# Patient Record
Sex: MALE | Race: BLACK OR AFRICAN AMERICAN | NOT HISPANIC OR LATINO | Employment: FULL TIME | URBAN - METROPOLITAN AREA
[De-identification: names, ages, dates, MRNs, and addresses within clinical notes are randomized per-mention and may not be internally consistent; named-entity substitution may affect disease eponyms.]

---

## 2023-02-28 ENCOUNTER — HOSPITAL ENCOUNTER (EMERGENCY)
Facility: HOSPITAL | Age: 43
Discharge: HOME | End: 2023-02-28
Attending: EMERGENCY MEDICINE

## 2023-02-28 VITALS
RESPIRATION RATE: 18 BRPM | DIASTOLIC BLOOD PRESSURE: 84 MMHG | WEIGHT: 170 LBS | SYSTOLIC BLOOD PRESSURE: 134 MMHG | HEART RATE: 80 BPM | TEMPERATURE: 99 F | OXYGEN SATURATION: 100 % | BODY MASS INDEX: 27.32 KG/M2 | HEIGHT: 66 IN

## 2023-02-28 DIAGNOSIS — R05.9 COUGH: ICD-10-CM

## 2023-02-28 DIAGNOSIS — R11.2 NAUSEA AND VOMITING, UNSPECIFIED VOMITING TYPE: ICD-10-CM

## 2023-02-28 DIAGNOSIS — J06.9 VIRAL URI WITH COUGH: Primary | ICD-10-CM

## 2023-02-28 DIAGNOSIS — K40.90 INGUINAL HERNIA OF RIGHT SIDE WITHOUT OBSTRUCTION OR GANGRENE: ICD-10-CM

## 2023-02-28 DIAGNOSIS — R10.31 RIGHT LOWER QUADRANT ABDOMINAL PAIN: ICD-10-CM

## 2023-02-28 LAB
ALBUMIN SERPL BCP-MCNC: 4 G/DL (ref 3.5–5.2)
ALP SERPL-CCNC: 47 U/L (ref 55–135)
ALT SERPL W/O P-5'-P-CCNC: 19 U/L (ref 10–44)
ANION GAP SERPL CALC-SCNC: 11 MMOL/L (ref 8–16)
AST SERPL-CCNC: 25 U/L (ref 10–40)
BASOPHILS # BLD AUTO: 0.03 K/UL (ref 0–0.2)
BASOPHILS NFR BLD: 0.5 % (ref 0–1.9)
BILIRUB SERPL-MCNC: 0.5 MG/DL (ref 0.1–1)
BILIRUB UR QL STRIP: NEGATIVE
BUN SERPL-MCNC: 11 MG/DL (ref 6–20)
CALCIUM SERPL-MCNC: 9.6 MG/DL (ref 8.7–10.5)
CHLORIDE SERPL-SCNC: 102 MMOL/L (ref 95–110)
CLARITY UR: CLEAR
CO2 SERPL-SCNC: 27 MMOL/L (ref 23–29)
COLOR UR: COLORLESS
CREAT SERPL-MCNC: 1.1 MG/DL (ref 0.5–1.4)
CTP QC/QA: YES
DIFFERENTIAL METHOD: ABNORMAL
EOSINOPHIL # BLD AUTO: 0.2 K/UL (ref 0–0.5)
EOSINOPHIL NFR BLD: 3.3 % (ref 0–8)
ERYTHROCYTE [DISTWIDTH] IN BLOOD BY AUTOMATED COUNT: 12.9 % (ref 11.5–14.5)
EST. GFR  (NO RACE VARIABLE): >60 ML/MIN/1.73 M^2
GLUCOSE SERPL-MCNC: 81 MG/DL (ref 70–110)
GLUCOSE UR QL STRIP: NEGATIVE
HCT VFR BLD AUTO: 41.2 % (ref 40–54)
HGB BLD-MCNC: 14.3 G/DL (ref 14–18)
HGB UR QL STRIP: NEGATIVE
IMM GRANULOCYTES # BLD AUTO: 0.02 K/UL (ref 0–0.04)
IMM GRANULOCYTES NFR BLD AUTO: 0.3 % (ref 0–0.5)
KETONES UR QL STRIP: NEGATIVE
LEUKOCYTE ESTERASE UR QL STRIP: NEGATIVE
LIPASE SERPL-CCNC: 17 U/L (ref 4–60)
LYMPHOCYTES # BLD AUTO: 1.3 K/UL (ref 1–4.8)
LYMPHOCYTES NFR BLD: 22.9 % (ref 18–48)
MCH RBC QN AUTO: 31.9 PG (ref 27–31)
MCHC RBC AUTO-ENTMCNC: 34.7 G/DL (ref 32–36)
MCV RBC AUTO: 92 FL (ref 82–98)
MOLECULAR STREP A: NEGATIVE
MONOCYTES # BLD AUTO: 0.6 K/UL (ref 0.3–1)
MONOCYTES NFR BLD: 9.5 % (ref 4–15)
NEUTROPHILS # BLD AUTO: 3.7 K/UL (ref 1.8–7.7)
NEUTROPHILS NFR BLD: 63.5 % (ref 38–73)
NITRITE UR QL STRIP: NEGATIVE
NRBC BLD-RTO: 0 /100 WBC
PH UR STRIP: 6 [PH] (ref 5–8)
PLATELET # BLD AUTO: 227 K/UL (ref 150–450)
PMV BLD AUTO: 10.5 FL (ref 9.2–12.9)
POC MOLECULAR INFLUENZA A AGN: NEGATIVE
POC MOLECULAR INFLUENZA B AGN: NEGATIVE
POTASSIUM SERPL-SCNC: 3.6 MMOL/L (ref 3.5–5.1)
PROT SERPL-MCNC: 8.2 G/DL (ref 6–8.4)
PROT UR QL STRIP: NEGATIVE
RBC # BLD AUTO: 4.48 M/UL (ref 4.6–6.2)
SARS-COV-2 RDRP RESP QL NAA+PROBE: NEGATIVE
SODIUM SERPL-SCNC: 140 MMOL/L (ref 136–145)
SP GR UR STRIP: 1.01 (ref 1–1.03)
URN SPEC COLLECT METH UR: ABNORMAL
UROBILINOGEN UR STRIP-ACNC: NEGATIVE EU/DL
WBC # BLD AUTO: 5.81 K/UL (ref 3.9–12.7)

## 2023-02-28 PROCEDURE — 99285 EMERGENCY DEPT VISIT HI MDM: CPT | Mod: 25

## 2023-02-28 PROCEDURE — 96361 HYDRATE IV INFUSION ADD-ON: CPT

## 2023-02-28 PROCEDURE — 85025 COMPLETE CBC W/AUTO DIFF WBC: CPT | Performed by: STUDENT IN AN ORGANIZED HEALTH CARE EDUCATION/TRAINING PROGRAM

## 2023-02-28 PROCEDURE — 63600175 PHARM REV CODE 636 W HCPCS

## 2023-02-28 PROCEDURE — 83690 ASSAY OF LIPASE: CPT | Performed by: STUDENT IN AN ORGANIZED HEALTH CARE EDUCATION/TRAINING PROGRAM

## 2023-02-28 PROCEDURE — 87502 INFLUENZA DNA AMP PROBE: CPT

## 2023-02-28 PROCEDURE — 96374 THER/PROPH/DIAG INJ IV PUSH: CPT

## 2023-02-28 PROCEDURE — 25500020 PHARM REV CODE 255

## 2023-02-28 PROCEDURE — 87651 STREP A DNA AMP PROBE: CPT

## 2023-02-28 PROCEDURE — 96375 TX/PRO/DX INJ NEW DRUG ADDON: CPT

## 2023-02-28 PROCEDURE — 81003 URINALYSIS AUTO W/O SCOPE: CPT | Performed by: STUDENT IN AN ORGANIZED HEALTH CARE EDUCATION/TRAINING PROGRAM

## 2023-02-28 PROCEDURE — 25000003 PHARM REV CODE 250

## 2023-02-28 PROCEDURE — 80053 COMPREHEN METABOLIC PANEL: CPT | Performed by: STUDENT IN AN ORGANIZED HEALTH CARE EDUCATION/TRAINING PROGRAM

## 2023-02-28 RX ORDER — KETOROLAC TROMETHAMINE 30 MG/ML
15 INJECTION, SOLUTION INTRAMUSCULAR; INTRAVENOUS
Status: COMPLETED | OUTPATIENT
Start: 2023-02-28 | End: 2023-02-28

## 2023-02-28 RX ORDER — FLUTICASONE PROPIONATE 50 MCG
1 SPRAY, SUSPENSION (ML) NASAL 2 TIMES DAILY PRN
Qty: 15 G | Refills: 0 | Status: SHIPPED | OUTPATIENT
Start: 2023-02-28 | End: 2023-07-07

## 2023-02-28 RX ORDER — ONDANSETRON 2 MG/ML
4 INJECTION INTRAMUSCULAR; INTRAVENOUS
Status: COMPLETED | OUTPATIENT
Start: 2023-02-28 | End: 2023-02-28

## 2023-02-28 RX ORDER — ONDANSETRON 4 MG/1
8 TABLET, ORALLY DISINTEGRATING ORAL EVERY 6 HOURS PRN
Qty: 15 TABLET | Refills: 0 | Status: SHIPPED | OUTPATIENT
Start: 2023-02-28 | End: 2023-07-07

## 2023-02-28 RX ORDER — PROMETHAZINE HYDROCHLORIDE AND DEXTROMETHORPHAN HYDROBROMIDE 6.25; 15 MG/5ML; MG/5ML
5 SYRUP ORAL EVERY 4 HOURS PRN
Qty: 118 ML | Refills: 0 | Status: SHIPPED | OUTPATIENT
Start: 2023-02-28 | End: 2023-07-07

## 2023-02-28 RX ORDER — ACETAMINOPHEN 500 MG
500 TABLET ORAL EVERY 6 HOURS PRN
Qty: 20 TABLET | Refills: 0 | Status: SHIPPED | OUTPATIENT
Start: 2023-02-28 | End: 2023-07-07

## 2023-02-28 RX ORDER — IBUPROFEN 600 MG/1
600 TABLET ORAL EVERY 6 HOURS PRN
Qty: 20 TABLET | Refills: 0 | Status: SHIPPED | OUTPATIENT
Start: 2023-02-28 | End: 2023-07-07

## 2023-02-28 RX ORDER — CETIRIZINE HYDROCHLORIDE 10 MG/1
10 TABLET ORAL DAILY PRN
Qty: 30 TABLET | Refills: 0 | Status: SHIPPED | OUTPATIENT
Start: 2023-02-28 | End: 2023-07-07

## 2023-02-28 RX ORDER — DICYCLOMINE HYDROCHLORIDE 20 MG/1
20 TABLET ORAL 2 TIMES DAILY PRN
Qty: 30 TABLET | Refills: 0 | Status: SHIPPED | OUTPATIENT
Start: 2023-02-28 | End: 2023-07-07

## 2023-02-28 RX ADMIN — KETOROLAC TROMETHAMINE 15 MG: 30 INJECTION, SOLUTION INTRAMUSCULAR; INTRAVENOUS at 06:02

## 2023-02-28 RX ADMIN — SODIUM CHLORIDE 1000 ML: 9 INJECTION, SOLUTION INTRAVENOUS at 05:02

## 2023-02-28 RX ADMIN — ONDANSETRON 4 MG: 2 INJECTION INTRAMUSCULAR; INTRAVENOUS at 06:02

## 2023-02-28 RX ADMIN — IOHEXOL 100 ML: 350 INJECTION, SOLUTION INTRAVENOUS at 06:02

## 2023-02-28 NOTE — ED PROVIDER NOTES
Encounter Date: 2/28/2023    SCRIBE #1 NOTE: I, Gaye Abad, am scribing for, and in the presence of,  Alayna Holdsworth, PA-C. I have scribed the following portions of the note - Other sections scribed: HPI, ROS.     History     Chief Complaint   Patient presents with    Cough    Shortness of Breath    Vomiting    Abdominal Pain     The patient reports upper abdominal pain, nausea, vomiting x 2 days. Patient also reports sob, wheezing, and a cough that started yesterday. Denies runny nose, headache, fever, headaches, body aches. He states that he has tenderness behind bilateral ears.    Otalgia     Cholo Huang 42 y.o. male, with Asthma, presents to the ED with intermittent periumbilical abdominal pain onset last night. Patient describes the pain as a dull pain and rates it a 6/10. Patient denies trauma. Patient states that the pain worsens when he has too much to each. Patient also reports a dry cough, chest pain secondary to cough, congestion, and a mild headache. Patient also reports 3-4 episodes of nausea and vomiting. Treatment has been attempted with Tylenol for pain which provided some relief. Patient needs a refill on his inhaler. Patient denies recent known sick contact. Patient reports having similar symptoms before when he had a stomach virus and when he was informed that he was lactose intolerant. Patient denies fever, sweats, chills, sore throat, ear pain, SOB, rhinorrhea, diarrhea, constipation, decreased urine, generalized myalgias, or other associated symptoms. Patient denies history of abdominal surgeries. Patient reports drinking socially. Patient denies recreational drug use. Patient has NKDA.    The history is provided by the patient. No  was used.   Review of patient's allergies indicates:  No Known Allergies  History reviewed. No pertinent past medical history.  History reviewed. No pertinent surgical history.  History reviewed. No pertinent family history.  Social  History     Tobacco Use    Smoking status: Never    Smokeless tobacco: Never   Substance Use Topics    Alcohol use: Not Currently    Drug use: Never     Review of Systems   Constitutional:  Negative for chills, diaphoresis, fatigue and fever.        (-) Sweats   HENT:  Positive for congestion. Negative for ear pain, rhinorrhea and sore throat.    Respiratory:  Positive for cough. Negative for shortness of breath and wheezing.    Cardiovascular:  Positive for chest pain (secondary to cough). Negative for palpitations and leg swelling.   Gastrointestinal:  Positive for abdominal pain, nausea and vomiting. Negative for constipation and diarrhea.   Genitourinary:  Negative for decreased urine volume, difficulty urinating, dysuria, frequency, hematuria, penile discharge, penile pain, penile swelling and urgency.   Musculoskeletal:  Negative for arthralgias and myalgias.   Skin:  Negative for rash.   Neurological:  Positive for headaches. Negative for dizziness, weakness and light-headedness.     Physical Exam     Initial Vitals [02/28/23 1536]   BP Pulse Resp Temp SpO2   118/71 76 16 97.9 °F (36.6 °C) 98 %      MAP       --         Physical Exam    Nursing note and vitals reviewed.  Constitutional: Vital signs are normal. He appears well-developed and well-nourished. He is not diaphoretic. He is active. He does not appear ill. No distress.   HENT:   Head: Normocephalic and atraumatic.   Right Ear: Tympanic membrane, external ear and ear canal normal.   Left Ear: Tympanic membrane and external ear normal.   Nose: Nose normal.   Mouth/Throat: Uvula is midline, oropharynx is clear and moist and mucous membranes are normal.   Eyes: Conjunctivae, EOM and lids are normal. Pupils are equal, round, and reactive to light. Right eye exhibits no discharge. Left eye exhibits no discharge. No scleral icterus.   Neck: Neck supple.   Normal range of motion.   Full passive range of motion without pain.     Cardiovascular:  Normal rate  and regular rhythm.           Pulmonary/Chest: Effort normal and breath sounds normal. No respiratory distress.   Abdominal: Abdomen is soft. He exhibits no distension. There is abdominal tenderness in the right lower quadrant and periumbilical area. There is no rebound, no guarding and no tenderness at McBurney's point. positive psoas signnegative Rovsing's sign  Musculoskeletal:         General: Normal range of motion.      Cervical back: Full passive range of motion without pain, normal range of motion and neck supple.     Neurological: He is alert and oriented to person, place, and time. He has normal strength. GCS eye subscore is 4. GCS verbal subscore is 5. GCS motor subscore is 6.   Skin: Skin is dry. Capillary refill takes less than 2 seconds.       ED Course   Procedures  Labs Reviewed   CBC W/ AUTO DIFFERENTIAL - Abnormal; Notable for the following components:       Result Value    RBC 4.48 (*)     MCH 31.9 (*)     All other components within normal limits   COMPREHENSIVE METABOLIC PANEL - Abnormal; Notable for the following components:    Alkaline Phosphatase 47 (*)     All other components within normal limits   URINALYSIS, REFLEX TO URINE CULTURE - Abnormal; Notable for the following components:    Color, UA Colorless (*)     All other components within normal limits    Narrative:     Specimen Source->Urine   LIPASE   SARS-COV-2 RDRP GENE   POCT INFLUENZA A/B MOLECULAR   POCT STREP A MOLECULAR   ISTAT CHEM8          Imaging Results              CT Abdomen Pelvis With Contrast (Final result)  Result time 02/28/23 19:50:50      Final result by Micheline Sánchez MD (02/28/23 19:50:50)                   Impression:      Large right nonobstructing inguinal hernia containing colon, small bowel, and fat.  No associated bowel obstruction.      Electronically signed by: Micheline Sánchez  Date:    02/28/2023  Time:    19:50               Narrative:    EXAMINATION:  CT OF ABDOMEN PELVIS WITH    CLINICAL  HISTORY:  Nausea/vomiting;RLQ abdominal pain (Age >= 14y);    TECHNIQUE:  3.75 mm enhanced axial images were obtained from the lung bases through the greater trochanters.  One hundred mL of Omnipaque 350 was injected.    COMPARISON:  None.    FINDINGS:  The liver, spleen, pancreas, kidneys, and adrenal glands are unremarkable. The gallbladder is contracted.    There is no definite evidence for abdominal adenopathy or ascites.    There is a large right inguinal hernia containing colon, small bowel, and fat.  There is no small bowel obstruction.  There are no pelvic masses or adenopathy.  The appendix is not inflamed.    There is no free fluid in the pelvis.    There is mild bibasilar atelectasis.    Spondylolisthesis and spondylolysis is seen at L5/S1.                                       X-Ray Chest PA And Lateral (Final result)  Result time 02/28/23 16:38:58      Final result by Maurice Campbell MD (02/28/23 16:38:58)                   Impression:      1. No acute cardiopulmonary process.      Electronically signed by: Maurice Campbell MD  Date:    02/28/2023  Time:    16:38               Narrative:    EXAMINATION:  XR CHEST PA AND LATERAL    CLINICAL HISTORY:  Cough, unspecified    TECHNIQUE:  PA and lateral views of the chest were performed.    COMPARISON:  None    FINDINGS:  The cardiomediastinal silhouette is not enlarged.  There is no pleural effusion.  The trachea is midline.  The lungs are symmetrically expanded bilaterally without evidence of acute parenchymal process. No large focal consolidation seen.  There is no pneumothorax.  The osseous structures are unremarkable.                                       Medications   sodium chloride 0.9% bolus 1,000 mL 1,000 mL (0 mLs Intravenous Stopped 2/28/23 1835)   ketorolac injection 15 mg (15 mg Intravenous Given 2/28/23 1801)   ondansetron injection 4 mg (4 mg Intravenous Given 2/28/23 1801)   iohexoL (OMNIPAQUE 350) injection 100 mL (100 mLs Intravenous  Given 2/28/23 1849)     Medical Decision Making:   History:   Old Medical Records: I decided to obtain old medical records.  Initial Assessment:   42 y.o. male, with Asthma, presents to the ED with intermittent periumbilical abdominal pain.  Patient's chart and medical history reviewed.  Differential Diagnosis:   Cholecystitis  Choledocholithiasis  Pancreatitis  Gastritis  GERD  SBO  Colitis  Appendicitis  UTI  COVID  Flu  Strep throat  Viral URI    Clinical Tests:   Lab Tests: Ordered and Reviewed  Radiological Study: Ordered and Reviewed  ED Management:  Patient's vitals reviewed.  He is afebrile, no respiratory distress, nontoxic-appearing in the ED. patient periumbilical and right lower quadrant tenderness to palpation.  Patient started on fluids given Toradol for pain, and Zofran for nausea.  Patient's COVID, flu, and strep negative.  CMP was unremarkable.  Lipase in normal range, pancreatitis unlikely.  CBC was unremarkable.  UA was unremarkable.  Chest x-ray showed no acute processes.  Patient states he is feeling a lot better.  CT showed a large right nonobstructing inguinal hernia containing colon, small bowel, and fat. No associated bowel obstruction.  Patient states he has known about this.  No tenderness to palpation.  Patient will be sent a referral to General surgery for further management of this hernia.  Discussed with patient his upper respiratory symptoms is most likely a viral URI.  Patient will be sent home on Motrin, Tylenol, Flonase, Zyrtec, promethazine DM cough syrup, and Tessalon Perles for symptomatic control.  Patient follow-up with General surgery.  Instructed patient to rest and stay well hydrated. Patient agrees with this plan. Discussed with him strict return precautions, he verbalized understanding. Patient is stable for discharge.           Scribe Attestation:   Scribe #1: I performed the above scribed service and the documentation accurately describes the services I performed. I  attest to the accuracy of the note.                 I, Alayna Holdsworth,PA-C, personally performed the services described in this documentation.  All medical record entries made by the scribe were at my direction and in my presence.  I have reviewed the chart and agree that the record reflects my personal performance and is accurate and complete.   Clinical Impression:   Final diagnoses:  [R05.9] Cough  [J06.9] Viral URI with cough (Primary)  [K40.90] Inguinal hernia of right side without obstruction or gangrene  [R11.2] Nausea and vomiting, unspecified vomiting type  [R10.31] Right lower quadrant abdominal pain        ED Disposition Condition    Discharge Stable          ED Prescriptions       Medication Sig Dispense Start Date End Date Auth. Provider    ibuprofen (ADVIL,MOTRIN) 600 MG tablet Take 1 tablet (600 mg total) by mouth every 6 (six) hours as needed for Pain. 20 tablet 2/28/2023 -- Alayna Holdsworth, PA-C    acetaminophen (TYLENOL) 500 MG tablet Take 1 tablet (500 mg total) by mouth every 6 (six) hours as needed for Temperature greater than or Pain. 20 tablet 2/28/2023 -- Alayna Holdsworth, PA-C    dicyclomine (BENTYL) 20 mg tablet Take 1 tablet (20 mg total) by mouth 2 (two) times daily as needed (Stomach pain). 30 tablet 2/28/2023 -- Alayna Holdsworth, PA-C    fluticasone propionate (FLONASE) 50 mcg/actuation nasal spray 1 spray (50 mcg total) by Each Nostril route 2 (two) times daily as needed for Rhinitis or Allergies. 15 g 2/28/2023 -- Alayna Holdsworth, PA-C    promethazine-dextromethorphan (PROMETHAZINE-DM) 6.25-15 mg/5 mL Syrp Take 5 mLs by mouth every 4 (four) hours as needed (cough/congestion). 118 mL 2/28/2023 -- Alayna Holdsworth, PA-C    cetirizine (ZYRTEC) 10 MG tablet Take 1 tablet (10 mg total) by mouth daily as needed for Allergies or Rhinitis. 30 tablet 2/28/2023 -- Alayna Holdsworth, PA-C    ondansetron (ZOFRAN-ODT) 4 MG TbDL Take 2 tablets (8 mg total) by mouth every 6 (six) hours as  needed (Nausea). 15 tablet 2/28/2023 -- Alayna Holdsworth, PA-C          Follow-up Information       Follow up With Specialties Details Why Contact Info    Donta Rivas MD General Surgery, Bariatrics Call   [address] [place] [address]  [phone]               Alayna Holdsworth, PA-C  02/28/23 2008

## 2023-02-28 NOTE — Clinical Note
"Cholo"Fernie Huang was seen and treated in our emergency department on 2/28/2023.  He may return to work on 03/01/2023.       If you have any questions or concerns, please don't hesitate to call.      Alayna Holdsworth, PA-C"

## 2023-03-01 NOTE — DISCHARGE INSTRUCTIONS
Thank you for coming to our Emergency Department today. It is important to remember that some problems are difficult to diagnose and may not be found during your first visit. Be sure to follow up with your primary care doctor and review any labs/imaging that was performed with them. If you do not have a primary care doctor, you may contact the one listed on your discharge paperwork or you may also call the [facility] Appointment Desk at [phone] to schedule an appointment with one.     All medications may potentially have side effects and it is impossible to predict which medications may give you side effects. If you feel that you are having a negative effect of any medication you should immediately stop taking them and seek medical attention.    Return to the ER with any questions/concerns, new/concerning symptoms, worsening or failure to improve. Do not drive or make any important decisions for 24 hours if you have received any pain medications, sedatives or mood altering drugs during your ER visit.

## 2023-07-14 PROBLEM — K40.91 RECURRENT INGUINAL HERNIA OF RIGHT SIDE WITHOUT OBSTRUCTION OR GANGRENE: Status: ACTIVE | Noted: 2023-07-14

## 2024-01-22 ENCOUNTER — EMERGENCY (EMERGENCY)
Facility: HOSPITAL | Age: 44
LOS: 1 days | Discharge: HOME | End: 2024-01-22
Attending: EMERGENCY MEDICINE | Admitting: EMERGENCY MEDICINE
Payer: MEDICARE

## 2024-01-22 VITALS
TEMPERATURE: 98 F | HEART RATE: 99 BPM | DIASTOLIC BLOOD PRESSURE: 72 MMHG | RESPIRATION RATE: 16 BRPM | WEIGHT: 231.04 LBS | OXYGEN SATURATION: 95 % | SYSTOLIC BLOOD PRESSURE: 113 MMHG | HEIGHT: 70 IN

## 2024-01-22 DIAGNOSIS — S01.01XA LACERATION WITHOUT FOREIGN BODY OF SCALP, INITIAL ENCOUNTER: ICD-10-CM

## 2024-01-22 DIAGNOSIS — U07.1 COVID-19: ICD-10-CM

## 2024-01-22 DIAGNOSIS — F17.200 NICOTINE DEPENDENCE, UNSPECIFIED, UNCOMPLICATED: ICD-10-CM
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
## 2024-01-22 LAB
FLUAV AG NPH QL: SIGNIFICANT CHANGE UP
FLUBV AG NPH QL: SIGNIFICANT CHANGE UP
RSV RNA NPH QL NAA+NON-PROBE: SIGNIFICANT CHANGE UP
SARS-COV-2 RNA SPEC QL NAA+PROBE: DETECTED

## 2024-01-22 PROCEDURE — 12002 RPR S/N/AX/GEN/TRNK2.6-7.5CM: CPT

## 2024-01-22 PROCEDURE — 99284 EMERGENCY DEPT VISIT MOD MDM: CPT | Mod: 25

## 2024-01-22 PROCEDURE — 99283 EMERGENCY DEPT VISIT LOW MDM: CPT | Mod: 25
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
## 2024-01-22 NOTE — ED PROVIDER NOTE - CARE PLAN
1 Principal Discharge DX:	Scalp laceration   Principal Discharge DX:	Scalp laceration  Secondary Diagnosis:	2019 novel coronavirus disease (COVID-19)

## 2024-01-22 NOTE — ED PROVIDER NOTE - PHYSICAL EXAMINATION
Gen: Well-appearing, No acute distress  HEENT: Stellate laceration on frontal scalp measuring [id number] from center (3 arms), deep to muscular layer  CV: Regular rate and rhythm   Lungs: Speaking full sentences, No respiratory distress, No tachypnea   Abd: Non-distended, Flat abdomen  Ext: No gross deformities, No peripheral edema  Neuro: A&O, Moving all extremities symmetrically, Normal gait  Skin: No rash, No pallor  Psych: Not responding to internal stimuli, Appropriate affect

## 2024-01-22 NOTE — ED PROVIDER NOTE - PROGRESS NOTE DETAILS
Advised patient of positive COVID result.  Recommending isolation, rest, hydration.  Speaking full sentences, non-hypoxic, without symptoms at time of discharge.
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
## 2024-01-22 NOTE — ED PROVIDER NOTE - PATIENT PORTAL LINK FT
21-Feb-2021 06:36
You can access the FollowMyHealth Patient Portal offered by [facility] by registering at the following website: [email or web address]. By joining [employer]’s FollowMyHealth portal, you will also be able to view your health information using other applications (apps) compatible with our system.

## 2024-01-22 NOTE — ED PROVIDER NOTE - NSFOLLOWUPINSTRUCTIONS_ED_ALL_ED_FT
Wound Closure Removal, Care After  The following information offers guidance on how to care for yourself after your stitches (sutures), staples, or adhesive strips have been removed. Your health care provider may also give you more specific instructions. If you have problems or questions, contact your health care provider.    What can I expect after the procedure?  After your sutures or staples have been removed or your adhesive strips have fallen off, it is common to have:  Some discomfort and swelling in the area.  Slight redness in the area.  Follow these instructions at home:  If you have a dressing:    Wash your hands with soap and water for at least 20 seconds before and after you change your bandage (dressing). If soap and water are not available, use hand .  Change your dressing as told by your health care provider. If your dressing becomes wet or dirty, or develops a bad smell, change it as soon as possible.  If your dressing sticks to your skin, pour warm, clean water over it until it loosens and can be removed without pulling apart the wound edges. Pat the area dry with a soft, clean towel. Do not rub the wound because that may cause bleeding.  Wound care    Washing hands with soap and water.  Check your wound every day for signs of infection. Check for:  More redness, swelling, or pain.  Fluid or blood.  New warmth, a rash, or hardness at the wound site.  Pus or a bad smell.  Wash your hands with soap and water for at least 20 seconds before and after touching your wound. If soap and water are not available, use hand .  Keep the wound area dry and clean. Clean and pat the wound dry as told by your health care provider.  Apply cream or ointment only as told by your health care provider.  If skin glue or adhesive strips were applied after sutures or staples were removed, leave these closures in place until they peel off on their own. If adhesive strip edges start to loosen and curl up, you may trim the loose edges. Do not remove adhesive strips completely unless your health care provider tells you to do that.  Continue to protect the wound from injury.  Do not pick at your wound. Picking can cause an infection.  Bathing    Do not take baths, swim, or use a hot tub until your health care provider approves. Ask your health care provider if you may take showers.  Follow these steps for showering:  If you have a dressing, remove it before getting into the shower.  In the shower, allow soapy water to get on the wound. Avoid scrubbing the wound.  When you get out of the shower, dry the wound by patting it with a clean towel.  Reapply a dressing over the wound, if needed.  Scar care    When your wound has completely healed, help decrease the size of your scar by:  Wearing sunscreen over the scar or covering it with clothing when you are outside. New scars get sunburned easily, which can make scarring worse.  Gently massaging the scarred area. This can decrease scar thickness.  General instructions    Take over-the-counter and prescription medicines only as told by your health care provider.  Keep all follow-up visits. This is important.  Contact a health care provider if:  You have more redness, swelling, or pain around your wound.  You have fluid or blood coming from your wound.  You have new warmth, a rash, or hardness at the wound site.  You have pus or a bad smell coming from your wound.  Your wound opens up.  Get help right away if:  You have a fever or chills.  You have red streaks coming from your wound.  Summary  Change your dressing as told by your health care provider. If your dressing becomes wet or dirty, or develops a bad smell, change it as soon as possible.  Check your wound every day for signs of infection.  Wash your hands with soap and water for 20 seconds before and after touching your wound.  This information is not intended to replace advice given to you by your health care provider. Make sure you discuss any questions you have with your health care provider.    Document Revised: 04/12/2022 Document Reviewed: 04/12/2022  Elsevier Patient Education © 2023 [employer]. Wound Closure Removal, Care After  The following information offers guidance on how to care for yourself after your stitches (sutures), staples, or adhesive strips have been removed. Your health care provider may also give you more specific instructions. If you have problems or questions, contact your health care provider.    What can I expect after the procedure?  After your sutures or staples have been removed or your adhesive strips have fallen off, it is common to have:  Some discomfort and swelling in the area.  Slight redness in the area.  Follow these instructions at home:  If you have a dressing:    Wash your hands with soap and water for at least 20 seconds before and after you change your bandage (dressing). If soap and water are not available, use hand .  Change your dressing as told by your health care provider. If your dressing becomes wet or dirty, or develops a bad smell, change it as soon as possible.  If your dressing sticks to your skin, pour warm, clean water over it until it loosens and can be removed without pulling apart the wound edges. Pat the area dry with a soft, clean towel. Do not rub the wound because that may cause bleeding.  Wound care    Washing hands with soap and water.  Check your wound every day for signs of infection. Check for:  More redness, swelling, or pain.  Fluid or blood.  New warmth, a rash, or hardness at the wound site.  Pus or a bad smell.  Wash your hands with soap and water for at least 20 seconds before and after touching your wound. If soap and water are not available, use hand .  Keep the wound area dry and clean. Clean and pat the wound dry as told by your health care provider.  Apply cream or ointment only as told by your health care provider.  If skin glue or adhesive strips were applied after sutures or staples were removed, leave these closures in place until they peel off on their own. If adhesive strip edges start to loosen and curl up, you may trim the loose edges. Do not remove adhesive strips completely unless your health care provider tells you to do that.  Continue to protect the wound from injury.  Do not pick at your wound. Picking can cause an infection.  Bathing    Do not take baths, swim, or use a hot tub until your health care provider approves. Ask your health care provider if you may take showers.  Follow these steps for showering:  If you have a dressing, remove it before getting into the shower.  In the shower, allow soapy water to get on the wound. Avoid scrubbing the wound.  When you get out of the shower, dry the wound by patting it with a clean towel.  Reapply a dressing over the wound, if needed.  Scar care    When your wound has completely healed, help decrease the size of your scar by:  Wearing sunscreen over the scar or covering it with clothing when you are outside. New scars get sunburned easily, which can make scarring worse.  Gently massaging the scarred area. This can decrease scar thickness.  General instructions    Take over-the-counter and prescription medicines only as told by your health care provider.  Keep all follow-up visits. This is important.  Contact a health care provider if:  You have more redness, swelling, or pain around your wound.  You have fluid or blood coming from your wound.  You have new warmth, a rash, or hardness at the wound site.  You have pus or a bad smell coming from your wound.  Your wound opens up.  Get help right away if:  You have a fever or chills.  You have red streaks coming from your wound.  Summary  Change your dressing as told by your health care provider. If your dressing becomes wet or dirty, or develops a bad smell, change it as soon as possible.  Check your wound every day for signs of infection.  Wash your hands with soap and water for 20 seconds before and after touching your wound.  This information is not intended to replace advice given to you by your health care provider. Make sure you discuss any questions you have with your health care provider.    Document Revised: 04/12/2022 Document Reviewed: 04/12/2022  Elsevier Patient Education © 2023 Elsevier Inc.    COVID-19  COVID-19 is an infection caused by a virus called SARS-CoV-2. Most people who get COVID-19 have mild to moderate symptoms. Some have little to no symptoms. In others, the virus may cause a severe infection.    What are the causes?  The human body, showing how the coronavirus travels from the air to a person's lungs.  COVID-19 is caused by a coronavirus. The virus may be in the air as droplets or as tiny specks of fluid (aerosols). It may also be on surfaces. You may catch the virus if you:  Breathe in droplets when a person with COVID-19 breathes, speaks, sings, coughs, or sneezes.  Touch something that has the virus on it and then touch your mouth, nose, or eyes.  What increases the risk?  Risk for infection:    You are more likely to get COVID-19 if:  You are within 6 ft (1.8 m) of a person who has COVID-19 for 15 minutes or longer.  You provide care to a person who has COVID-19.  You are in close contact with others. This includes hugging, kissing, or sharing utensils.  Risk for serious illness caused by COVID-19:    You are more likely to get very ill from COVID-19 if:  You have cancer.  You have a long-term (chronic) disease. This may be:  A chronic lung disease, such as pulmonary embolism, chronic obstructive pulmonary disease (COPD), or cystic fibrosis.  A disease that affects your body's defense system (immune system). If you have a weak immune system, you are said to be immunocompromised.  A serious heart condition, such as heart failure, coronary artery disease, or cardiomyopathy.  Diabetes.  Chronic kidney disease.  A liver disease, such as cirrhosis, nonalcoholic fatty liver disease, alcoholic liver disease, or autoimmune hepatitis.  You are obese.  You are pregnant or were just pregnant.  You have sickle cell disease.  What are the signs or symptoms?  Symptoms of COVID-19 can range from mild to severe. They may appear any time from 2 to 14 days after you are exposed. They include:  Fever or chills.  Shortness of breath or trouble breathing.  Feeling tired.  Headaches, body aches, or muscle aches.  A runny or stuffy nose.  Sneezing, coughing, or a sore throat.  New loss of taste or smell.  You may also have stomach problems, such as nausea, vomiting, or diarrhea.    In some cases, you may not have any symptoms.    How is this diagnosed?  A sample being collected by swabbing the nose.  COVID-19 may be diagnosed by testing a sample to check for the virus. The most common tests are the PCR test and the antigen test. Tests may be done in the lab or at home. They include:  Using a swab to take a sample of fluid from your nose.  Testing a sample of saliva from your mouth.  Testing a sample of mucus from your lungs (sputum).  How is this treated?  Treatment for COVID-19 depends on how severe your condition is.  Mild symptoms can be treated at home. You should rest, drink fluids, and take over-the-counter medicine.  If you have symptoms and risk factors, you may be prescribed a medicine that fights viruses (antiviral).  Severe symptoms may be treated in a hospital intensive care unit (ICU). Treatment may include:  Extra oxygen given through a tube in the nose, a face mask, or a canales.  Medicines. These may include:  Antivirals, such as remdesivir.  Anti-inflammatories, such as corticosteroids. These help reduce inflammation.  Antithrombotics. These help prevent or treat blood clots.  Convalescent plasma. This helps boost your immune system.  Prone positioning. This is when you are laid on your stomach to help oxygen get into your lungs.  Infection control measures.  If you are at risk for a more serious illness, your health care provider may prescribe two medicines to help your immune system protect you. These are called long-acting monoclonal antibodies. They are given together every 6 months.    How is this prevented?  To protect yourself:    Get the vaccine or vaccine series if you meet the guidelines. You can even get the vaccine while you are pregnant or making breast milk (lactating).  Get an added dose of the vaccine if you are immunocompromised. This applies if you have had an organ transplant or if you have a condition that affects your immune system.  You should get the added dose 4 weeks after you got the first one.  If you get an mRNA vaccine, you will need to get 3 doses.  Talk to your provider about getting experimental monoclonal antibodies. This treatment can help prevent severe illness. It may be given to you if:  You are immunocompromised.  You cannot get the vaccine. You may not get the vaccine if you have a severe allergic reaction to it or to what it is made of.  You are not fully vaccinated.  You are in a place where there is COVID-19 and:  You are in close contact with someone who has COVID-19.  You are at high risk of being exposed.  You are at risk of illness from new variants of the virus.  To protect others:    If you have symptoms of COVID-19, take steps to stop the virus from spreading.  Stay home. Leave your house only to get medical care. Do not use public transit.  Do not travel while you are sick.  Wash your hands often with soap and water for at least 20 seconds. If soap and water are not available, use alcohol-based hand .  Make sure that all people in your household wash their hands well and often.  Cough or sneeze into a tissue or your sleeve or elbow. Do not cough or sneeze into your hand or into the air.  Where to find more information  Centers for Disease Control and Prevention (CDC): cdc.gov  World Health Organization (WHO): who.int  Get help right away if:  You have trouble breathing.  You have pain or pressure in your chest.  You are confused.  Your lips or fingernails turn blue.  You have trouble waking from sleep.  Your symptoms get worse.  These symptoms may be an emergency. Get help right away. Call 911.  Do not wait to see if the symptoms will go away.  Do not drive yourself to the hospital.  This information is not intended to replace advice given to you by your health care provider. Make sure you discuss any questions you have with your health care provider.    Document Revised: 09/01/2023 Document Reviewed: 09/01/2023  Elsevier Patient Education © 2023 Elsevier Inc.

## 2024-01-22 NOTE — ED ADULT NURSE NOTE - NSFALLUNIVINTERV_ED_ALL_ED
Bed/Stretcher in lowest position, wheels locked, appropriate side rails in place/Call bell, personal items and telephone in reach/Instruct patient to call for assistance before getting out of bed/chair/stretcher/Non-slip footwear applied when patient is off stretcher/[place] to call system/Physically safe environment - no spills, clutter or unnecessary equipment/Purposeful proactive rounding/Room/bathroom lighting operational, light cord in reach

## 2024-01-27 ENCOUNTER — EMERGENCY (EMERGENCY)
Facility: HOSPITAL | Age: 44
LOS: 1 days | Discharge: HOME | End: 2024-01-27
Attending: EMERGENCY MEDICINE | Admitting: EMERGENCY MEDICINE
Payer: MEDICARE

## 2024-01-27 VITALS
HEART RATE: 93 BPM | OXYGEN SATURATION: 99 % | WEIGHT: 240.08 LBS | RESPIRATION RATE: 15 BRPM | TEMPERATURE: 98 F | HEIGHT: 70 IN | DIASTOLIC BLOOD PRESSURE: 70 MMHG | SYSTOLIC BLOOD PRESSURE: 110 MMHG

## 2024-01-27 PROCEDURE — 99284 EMERGENCY DEPT VISIT MOD MDM: CPT

## 2024-01-27 PROCEDURE — 73502 X-RAY EXAM HIP UNI 2-3 VIEWS: CPT | Mod: 26,RT

## 2024-01-27 RX ORDER — IBUPROFEN 200 MG
600 TABLET ORAL ONCE
Refills: 0 | Status: COMPLETED | OUTPATIENT
Start: 2024-01-27 | End: 2024-01-27

## 2024-01-27 NOTE — ED PROVIDER NOTE - ATTENDING CONTRIBUTION TO CARE
I saw and evaluated the patient. I discussed the case with the MARTA/resident and agree with the findings and helped develop the plan of care as documented in the MARTA/resident's note. I agree with the findings and plan of care as documented in the MARTA/resident's note.

## 2024-01-27 NOTE — ED PROVIDER NOTE - NS ED ATTENDING STATEMENT MOD
I have seen and examined this patient and fully participated in the care of this patient as the teaching attending.  The service was shared with the MARTA.  I reviewed and verified the documentation.

## 2024-01-27 NOTE — ED PROVIDER NOTE - CLINICAL SUMMARY MEDICAL DECISION MAKING FREE TEXT BOX
43-year-old male presents with chronic right hip pain.  Atraumatic.  No falls or trauma to the area.  Patient able to ambulate.  Denies any weakness or changes in sensation.  Denies any fevers chills chest pain shortness of breath abdominal pain nausea vomiting urinary symptoms.  Vital signs stable.  Well-appearing male no acute distress.  Able to ambulate without difficulty.  Full range of motion of hip and knee.  Neurovascular intact bilateral lower extremities.  No tenderness swelling erythema or bruising.  Will evaluate for fracture versus location although low clinical suspicion and provide analgesia.

## 2024-01-27 NOTE — ED PROVIDER NOTE - PATIENT PORTAL LINK FT
You can access the FollowMyHealth Patient Portal offered by [facility] by registering at the following website: [email or web address]. By joining [employer]’s FollowMyHealth portal, you will also be able to view your health information using other applications (apps) compatible with our system.

## 2024-01-29 DIAGNOSIS — M25.551 PAIN IN RIGHT HIP: ICD-10-CM

## 2024-01-29 DIAGNOSIS — G89.29 OTHER CHRONIC PAIN: ICD-10-CM

## 2024-02-01 ENCOUNTER — EMERGENCY (EMERGENCY)
Facility: HOSPITAL | Age: 44
LOS: 1 days | Discharge: HOME | End: 2024-02-01
Admitting: EMERGENCY MEDICINE
Payer: MEDICARE

## 2024-02-01 VITALS
TEMPERATURE: 97 F | HEART RATE: 100 BPM | DIASTOLIC BLOOD PRESSURE: 69 MMHG | HEIGHT: 70 IN | WEIGHT: 229.94 LBS | SYSTOLIC BLOOD PRESSURE: 101 MMHG | OXYGEN SATURATION: 97 % | RESPIRATION RATE: 18 BRPM

## 2024-02-01 DIAGNOSIS — S01.81XD LACERATION WITHOUT FOREIGN BODY OF OTHER PART OF HEAD, SUBSEQUENT ENCOUNTER: ICD-10-CM

## 2024-02-01 PROCEDURE — L9995: CPT
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
## 2024-02-01 NOTE — ED PROVIDER NOTE - CLINICAL SUMMARY MEDICAL DECISION MAKING FREE TEXT BOX
Patient presents for staple removal of 8 staples placed on the forehead.  Staples removed without incident  Patient stable for discharge  Results with patient.  Patient understands agrees with plan.  Agreed to follow-up with primary care doctor in 2 to 3 days.

## 2024-02-01 NOTE — ED PROVIDER NOTE - PATIENT PORTAL LINK FT
You can access the FollowMyHealth Patient Portal offered by [facility] by registering at the following website: [email or web address]. By joining [facility]’s FollowMyHealth portal, you will also be able to view your health information using other applications (apps) compatible with our system.

## 2024-02-01 NOTE — ED PROVIDER NOTE - PHYSICAL EXAMINATION
General: well developed, well nourished, no distress  Eyes: no scleral injection  Neck: non-tender, full range of motion, supple.   Respiratory: unlabored breathing  Cardiovascular: no central cyanosis  Musculoskeletal: normal gait.   Extremities: normal range of motion, non-tender.  Neurologic: alert, oriented to person, oriented to place, oriented to time.    Skin: normal color.  Negative For: rash  8 staples appreciated on the frontal aspect of his head.No infectious processes seen.  Psychiatric: normal affect, normal insight, normal concentration

## 2024-02-01 NOTE — ED ADULT TRIAGE NOTE - CHIEF COMPLAINT QUOTE
Pt walks in for staple removal from L scalp. Pt had staples placed 1/22, states they are healing well.

## 2024-05-09 ENCOUNTER — EMERGENCY (EMERGENCY)
Facility: HOSPITAL | Age: 44
LOS: 1 days | Discharge: HOME | End: 2024-05-09
Attending: EMERGENCY MEDICINE | Admitting: EMERGENCY MEDICINE
Payer: MEDICARE

## 2024-05-09 VITALS
SYSTOLIC BLOOD PRESSURE: 120 MMHG | OXYGEN SATURATION: 97 % | WEIGHT: 216.93 LBS | DIASTOLIC BLOOD PRESSURE: 76 MMHG | RESPIRATION RATE: 16 BRPM | TEMPERATURE: 98 F | HEART RATE: 89 BPM

## 2024-05-09 PROCEDURE — 99284 EMERGENCY DEPT VISIT MOD MDM: CPT

## 2024-05-09 RX ORDER — IBUPROFEN 200 MG
1 TABLET ORAL
Qty: 30 | Refills: 0
Start: 2024-05-09

## 2024-05-09 RX ORDER — IBUPROFEN 200 MG
800 TABLET ORAL ONCE
Refills: 0 | Status: COMPLETED | OUTPATIENT
Start: 2024-05-09 | End: 2024-05-09

## 2024-05-09 RX ADMIN — Medication 800 MILLIGRAM(S): at 23:32

## 2024-05-09 NOTE — ED PROVIDER NOTE - OBJECTIVE STATEMENT
The patient is an undomiciled 43 yr old male with PMHx of bipolar disorder & schizophrenia.  He is a very poor historian and presents for evaluation of chronic right hip pain and swelling.  He was seen here in January of this year for similar complaint, but he never followed up as recommended.  He states he has had been "walking unevenly" lately due to uneven sidewalks.  He denies any recent fall or any other injury. He is able to bear weight without difficulty.  Chronic lower back pain, but no radicular pain.  No weakness/numbness/paresthesias of extremities.  He states he has been seen at several other ERs over the past several months for the same, but he cannot recall what was diagnosed.  He has not seen an orthopedic surgeon.

## 2024-05-09 NOTE — ED ADULT TRIAGE NOTE - CHIEF COMPLAINT QUOTE
male patient walk in to ED for eval of right hip pain x 3 days. patient endorses hx of sciatic nerve pain and states he has been having difficulty walking and sleeping

## 2024-05-09 NOTE — ED PROVIDER NOTE - CARE PROVIDER_API CALL
Blaine Joe  Orthopaedic Surgery  [address] [place] [address], [address]  [place] [address]  Phone: [phone]  Fax: [phone]  Follow Up Time:

## 2024-05-09 NOTE — ED PROVIDER NOTE - PATIENT PORTAL LINK FT
You can access the FollowMyHealth Patient Portal offered by [facility] by registering at the following website: [email or web address]. By joining [employer]’s FollowMyHealth portal, you will also be able to view your health information using other applications (apps) compatible with our system.

## 2024-05-09 NOTE — ED PROVIDER NOTE - CLINICAL SUMMARY MEDICAL DECISION MAKING FREE TEXT BOX
Pt here with chronic right hip pain.  No recent injury or trauma.  Given Motrin for pain & referred to Ortho for follow up

## 2024-05-09 NOTE — ED ADULT NURSE NOTE - NSFALLUNIVINTERV_ED_ALL_ED
Bed/Stretcher in lowest position, wheels locked, appropriate side rails in place/Call bell, personal items and telephone in reach/Instruct patient to call for assistance before getting out of bed/chair/stretcher/Non-slip footwear applied when patient is off stretcher/[place] to call system/Physically safe environment - no spills, clutter or unnecessary equipment/Purposeful proactive rounding/Room/bathroom lighting operational, light cord in reach

## 2024-05-09 NOTE — ED PROVIDER NOTE - NSFOLLOWUPINSTRUCTIONS_ED_ALL_ED_FT
Hip Pain    The hip is the joint between the upper legs and the lower pelvis. The bones, cartilage, tendons, and muscles of your hip joint support your body and allow you to move around.    Hip pain can range from a minor ache to severe pain in one or both of your hips. The pain may be felt on the inside of the hip joint near the groin, or on the outside near the buttocks and upper thigh. You may also have swelling or stiffness in your hip area.    Follow these instructions at home:      Managing pain, stiffness, and swelling      If directed, put ice on the painful area. To do this:    Put ice in a plastic bag.  Place a towel between your skin and the bag.  Leave the ice on for 20 minutes, 2–3 times a day.  If directed, apply heat to the affected area as often as told by your health care provider. Use the heat source that your health care provider recommends, such as a moist heat pack or a heating pad.    Place a towel between your skin and the heat source.  Leave the heat on for 20–30 minutes.  Remove the heat if your skin turns bright red. This is especially important if you are unable to feel pain, heat, or cold. You may have a greater risk of getting burned.        Activity    Do exercises as told by your health care provider.  Avoid activities that cause pain.        General instructions     Take over-the-counter and prescription medicines only as told by your health care provider.  Keep a journal of your symptoms. Write down:    How often you have hip pain.  The location of your pain.  What the pain feels like.  What makes the pain worse.  Sleep with a pillow between your legs on your most comfortable side.  Keep all follow-up visits as told by your health care provider. This is important.    Contact a health care provider if:  You cannot put weight on your leg.  Your pain or swelling continues or gets worse after one week.  It gets harder to walk.  You have a fever.    Get help right away if:  You fall.  You have a sudden increase in pain and swelling in your hip.  Your hip is red or swollen or very tender to touch.    Summary  Hip pain can range from a minor ache to severe pain in one or both of your hips.  The pain may be felt on the inside of the hip joint near the groin, or on the outside near the buttocks and upper thigh.  Avoid activities that cause pain.  Write down how often you have hip pain, the location of the pain, what makes it worse, and what it feels like.

## 2024-05-09 NOTE — ED ADULT NURSE NOTE - OBJECTIVE STATEMENT
Patient is a 44 y/o M c/o right hip pain. patient reports right hip pain for the past 3 days. Patient denies falls. Patient reports hx of sciatic nerve. patient denies urinary or bowel incontinence.

## 2024-05-09 NOTE — ED PROVIDER NOTE - MUSCULOSKELETAL, MLM
Spine appears normal, range of motion is not limited, no muscle or joint tenderness.  Full ROM in both hips.

## 2024-05-13 DIAGNOSIS — M54.50 LOW BACK PAIN, UNSPECIFIED: ICD-10-CM

## 2024-05-13 DIAGNOSIS — M25.551 PAIN IN RIGHT HIP: ICD-10-CM

## 2024-05-13 DIAGNOSIS — F20.9 SCHIZOPHRENIA, UNSPECIFIED: ICD-10-CM

## 2024-05-13 DIAGNOSIS — G89.29 OTHER CHRONIC PAIN: ICD-10-CM

## 2024-05-13 DIAGNOSIS — Z59.00 HOMELESSNESS UNSPECIFIED: ICD-10-CM

## 2024-05-13 DIAGNOSIS — F31.9 BIPOLAR DISORDER, UNSPECIFIED: ICD-10-CM

## 2024-05-13 SDOH — ECONOMIC STABILITY - HOUSING INSECURITY: HOMELESSNESS UNSPECIFIED: Z59.00

## 2024-05-22 ENCOUNTER — EMERGENCY (EMERGENCY)
Facility: HOSPITAL | Age: 44
LOS: 1 days | Discharge: HOME | End: 2024-05-22
Attending: EMERGENCY MEDICINE | Admitting: EMERGENCY MEDICINE
Payer: MEDICARE

## 2024-05-22 VITALS
RESPIRATION RATE: 18 BRPM | TEMPERATURE: 97 F | DIASTOLIC BLOOD PRESSURE: 76 MMHG | OXYGEN SATURATION: 98 % | HEART RATE: 94 BPM | SYSTOLIC BLOOD PRESSURE: 123 MMHG

## 2024-05-22 DIAGNOSIS — F17.200 NICOTINE DEPENDENCE, UNSPECIFIED, UNCOMPLICATED: ICD-10-CM

## 2024-05-22 DIAGNOSIS — M79.89 OTHER SPECIFIED SOFT TISSUE DISORDERS: ICD-10-CM

## 2024-05-22 DIAGNOSIS — I87.8 OTHER SPECIFIED DISORDERS OF VEINS: ICD-10-CM

## 2024-05-22 PROBLEM — F20.9 SCHIZOPHRENIA, UNSPECIFIED: Chronic | Status: ACTIVE | Noted: 2024-05-09

## 2024-05-22 PROBLEM — F31.9 BIPOLAR DISORDER, UNSPECIFIED: Chronic | Status: ACTIVE | Noted: 2024-05-09

## 2024-05-22 PROCEDURE — 99283 EMERGENCY DEPT VISIT LOW MDM: CPT

## 2024-05-22 RX ORDER — IBUPROFEN 200 MG
600 TABLET ORAL ONCE
Refills: 0 | Status: COMPLETED | OUTPATIENT
Start: 2024-05-22 | End: 2024-05-22

## 2024-05-22 RX ORDER — ACETAMINOPHEN 500 MG
650 TABLET ORAL ONCE
Refills: 0 | Status: COMPLETED | OUTPATIENT
Start: 2024-05-22 | End: 2024-05-22

## 2024-05-22 RX ADMIN — Medication 600 MILLIGRAM(S): at 04:57

## 2024-05-22 RX ADMIN — Medication 650 MILLIGRAM(S): at 04:57

## 2024-05-22 NOTE — ED PROVIDER NOTE - NSFOLLOWUPINSTRUCTIONS_ED_ALL_ED_FT
Please obtain compression stockings and wear daily.  Elevate your feet as much as you are able to to assist with the swelling.  You will also need to follow-up with your primary care physician as you have poor circulation in your lower extremities which is causing your symptoms.    PLEASE FOLLOW-UP WITH YOUR PRIMARY CARE DOCTOR IN 1-2 DAYS FOR FURTHER EVALUATION.      PLEASE TAKE ALL PAPERWORK FROM TODAY'S VISIT TO YOUR PRIMARY DOCTOR.     IF YOU DO NOT HAVE A PRIMARY CARE DOCTOR PLEASE REFER TO THE OFFICE/CLINIC INFORMATION GIVEN BELOW:    If you do not have a doctor, you can call our referral line to find a doctor that matches your insurance; the number is [phone].     You can also follow up with clinics listed below, if you do not have a doctor:  [place] [address]  To make an appointment, call [phone]    [facility] [place], operated by [facility]  Address: [address] [place] [id number]  Appointment Center: [phone] ([phone])     PLEASE RETURN TO THE ER IMMEDIATELY OR CALL [phone] ANY HIGH FEVER, CHEST PAIN, TROUBLE BREATHING, VOMITING, SEVERE PAIN, OR ANY OTHER CONCERNS.    To access your record on the patient portal [facility], please visit:  [email or web address]  If you are having difficulties setting this up, call [phone] and someone can assist you over the phone.

## 2024-05-22 NOTE — ED PROVIDER NOTE - OBJECTIVE STATEMENT
43-year-old male denies significant past medical history  Patient presents with swelling to bilateral lower extremities that he noticed after walking around on his feet all day.  Denies any significant pain besides some mild tightness sensations.  Has noticed some discoloration to the lower part of his lower legs as well that seems to be chronic.  States that he has been advised to wear compression stockings in the past, but has not picked them up or worn them yet.  Denies chest pain, shortness of breath, fever/chills, nausea/vomiting, abdominal pain, bowel/bladder habit changes, scrotal swelling.

## 2024-05-22 NOTE — ED ADULT NURSE NOTE - OBJECTIVE STATEMENT
Pt is a 43y male c/o lower leg pain/injury. Pt reports noticing b/l LE swelling after doing calf raises earlier today. Denies pain. Ambulatory w/ steady gait. Sensation and ROM intact, 2+ pulses b/l.

## 2024-05-22 NOTE — ED PROVIDER NOTE - PATIENT PORTAL LINK FT
You can access the FollowMyHealth Patient Portal offered by [facility] by registering at the following website: [email or web address]. By joining [facility]’s FollowMyHealth portal, you will also be able to view your health information using other applications (apps) compatible with our system.

## 2024-05-22 NOTE — ED PROVIDER NOTE - CLINICAL SUMMARY MEDICAL DECISION MAKING FREE TEXT BOX
43-year-old male denies significant past medical history  Patient presents with swelling to bilateral lower extremities that he noticed after walking around on his feet all day.  Denies any significant pain besides some mild tightness sensations.  Has noticed some discoloration to the lower part of his lower legs as well that seems to be chronic.  States that he has been advised to wear compression stockings in the past, but has not picked them up or worn them yet.  Denies chest pain, shortness of breath, fever/chills, nausea/vomiting, abdominal pain, bowel/bladder habit changes, scrotal swelling.    PE: Well-appearing, no acute distress, alert and oriented x 3, calm cooperative.  Nonlabored respirations clear to auscultation bilaterally.  Heart regular rate and rhythm.  Bilateral lower extremities: Loss of hair to the lower legs with underlying venous stasis skin changes and trace pitting edema; no significant tenderness palpation or increased warmth.    MDM: Patient presents with signs and symptoms consistent with venous stasis changes including pedal edema.  Patient denies any symptoms concerning for fluid overload such as shortness of breath or chest pain.  Will provide paper prescription for compression stockings and have advised patient to follow-up with primary care.

## 2024-05-23 PROBLEM — Z00.00 ENCOUNTER FOR PREVENTIVE HEALTH EXAMINATION: Status: ACTIVE | Noted: 2024-05-23

## 2024-05-24 ENCOUNTER — APPOINTMENT (OUTPATIENT)
Dept: VASCULAR SURGERY | Facility: CLINIC | Age: 44
End: 2024-05-24

## 2024-05-29 ENCOUNTER — APPOINTMENT (OUTPATIENT)
Dept: INTERNAL MEDICINE | Facility: CLINIC | Age: 44
End: 2024-05-29

## 2024-07-23 ENCOUNTER — EMERGENCY (EMERGENCY)
Facility: HOSPITAL | Age: 44
LOS: 1 days | Discharge: LEFT BEFORE DISCHARGE | End: 2024-07-23
Attending: EMERGENCY MEDICINE | Admitting: EMERGENCY MEDICINE
Payer: MEDICARE

## 2024-07-23 VITALS
RESPIRATION RATE: 16 BRPM | SYSTOLIC BLOOD PRESSURE: 126 MMHG | HEART RATE: 82 BPM | TEMPERATURE: 98 F | DIASTOLIC BLOOD PRESSURE: 89 MMHG | OXYGEN SATURATION: 99 %

## 2024-07-23 DIAGNOSIS — M79.641 PAIN IN RIGHT HAND: ICD-10-CM

## 2024-07-23 DIAGNOSIS — Z53.21 PROCEDURE AND TREATMENT NOT CARRIED OUT DUE TO PATIENT LEAVING PRIOR TO BEING SEEN BY HEALTH CARE PROVIDER: ICD-10-CM

## 2024-07-23 PROCEDURE — L9991: CPT

## 2024-07-23 NOTE — ED ADULT NURSE NOTE - OBJECTIVE STATEMENT
Pt continues to tolerate feed with formula from bottle at this time, continuing to make wet diapers in basinet. On full monitors.    Pt is a 44y male c/o hand pain/injury. Pt reports he punched a wall and was d/[facility]rera from [employer] today w/ a fracture. States "they wrapped it wrong so I took it off and I need it wrapped again".

## 2024-07-23 NOTE — ED ADULT TRIAGE NOTE - CHIEF COMPLAINT QUOTE
Pt walked in c/o R hand pain. States punched a wall on7/9, was evaluated at [facility] and told he fractured his hand.

## 2024-07-23 NOTE — ED ADULT NURSE NOTE - CHIEF COMPLAINT QUOTE
Pt walked in c/o R hand pain. States punched a wall on7/9, was evaluated at [facility] and told he fractured his hand.

## 2024-07-23 NOTE — ED PROVIDER NOTE - IV ALTEPLASE INCLUSION HIDDEN
OSMANY FROM [facility] CALLED TO SEE IF WE HAVE AN UPDATE ON PADS RESULTS FROM 5/23, OR WANTS TO KNOW IF THERE IS ANYTHING SHE CAN DO AS THE EMPLOYMENT GROUP THAT CAN EXPEDITE THE PROCESS FOR GETTING THESE RESULTS.    PLEASE CONTACT WITH AN UPDATE    THANK YOU   show